# Patient Record
Sex: FEMALE | Race: WHITE
[De-identification: names, ages, dates, MRNs, and addresses within clinical notes are randomized per-mention and may not be internally consistent; named-entity substitution may affect disease eponyms.]

---

## 2018-02-05 ENCOUNTER — HOSPITAL ENCOUNTER (INPATIENT)
Dept: HOSPITAL 5 - TRG | Age: 33
LOS: 2 days | Discharge: HOME | End: 2018-02-07
Attending: OBSTETRICS & GYNECOLOGY | Admitting: OBSTETRICS & GYNECOLOGY
Payer: MEDICAID

## 2018-02-05 DIAGNOSIS — Z3A.38: ICD-10-CM

## 2018-02-05 LAB
HCT VFR BLD CALC: 40.4 % (ref 30.3–42.9)
HGB BLD-MCNC: 14 GM/DL (ref 10.1–14.3)
MCH RBC QN AUTO: 33 PG (ref 28–32)
MCHC RBC AUTO-ENTMCNC: 35 % (ref 30–34)
MCV RBC AUTO: 94 FL (ref 79–97)
PLATELET # BLD: 204 K/MM3 (ref 140–440)
RBC # BLD AUTO: 4.31 M/MM3 (ref 3.65–5.03)

## 2018-02-05 PROCEDURE — 85027 COMPLETE CBC AUTOMATED: CPT

## 2018-02-05 PROCEDURE — 36415 COLL VENOUS BLD VENIPUNCTURE: CPT

## 2018-02-05 PROCEDURE — 86850 RBC ANTIBODY SCREEN: CPT

## 2018-02-05 PROCEDURE — 85018 HEMOGLOBIN: CPT

## 2018-02-05 PROCEDURE — G0463 HOSPITAL OUTPT CLINIC VISIT: HCPCS

## 2018-02-05 PROCEDURE — 86592 SYPHILIS TEST NON-TREP QUAL: CPT

## 2018-02-05 PROCEDURE — 99211 OFF/OP EST MAY X REQ PHY/QHP: CPT

## 2018-02-05 PROCEDURE — 3E0R3BZ INTRODUCTION OF ANESTHETIC AGENT INTO SPINAL CANAL, PERCUTANEOUS APPROACH: ICD-10-PCS | Performed by: OBSTETRICS & GYNECOLOGY

## 2018-02-05 PROCEDURE — 86901 BLOOD TYPING SEROLOGIC RH(D): CPT

## 2018-02-05 PROCEDURE — 59025 FETAL NON-STRESS TEST: CPT

## 2018-02-05 PROCEDURE — 85014 HEMATOCRIT: CPT

## 2018-02-05 PROCEDURE — A6250 SKIN SEAL PROTECT MOISTURIZR: HCPCS

## 2018-02-05 PROCEDURE — 86900 BLOOD TYPING SEROLOGIC ABO: CPT

## 2018-02-05 PROCEDURE — 0HQ9XZZ REPAIR PERINEUM SKIN, EXTERNAL APPROACH: ICD-10-PCS | Performed by: OBSTETRICS & GYNECOLOGY

## 2018-02-05 RX ADMIN — FENTANYL CITRATE PRN MCG: 50 INJECTION, SOLUTION INTRAMUSCULAR; INTRAVENOUS at 16:37

## 2018-02-05 RX ADMIN — SODIUM CHLORIDE, SODIUM LACTATE, POTASSIUM CHLORIDE, AND CALCIUM CHLORIDE SCH MLS/HR: .6; .31; .03; .02 INJECTION, SOLUTION INTRAVENOUS at 17:18

## 2018-02-05 RX ADMIN — SODIUM CHLORIDE, SODIUM LACTATE, POTASSIUM CHLORIDE, AND CALCIUM CHLORIDE SCH MLS/HR: .6; .31; .03; .02 INJECTION, SOLUTION INTRAVENOUS at 14:40

## 2018-02-05 RX ADMIN — FENTANYL CITRATE PRN MCG: 50 INJECTION, SOLUTION INTRAMUSCULAR; INTRAVENOUS at 14:40

## 2018-02-05 RX ADMIN — SODIUM CHLORIDE, SODIUM LACTATE, POTASSIUM CHLORIDE, AND CALCIUM CHLORIDE SCH MLS/HR: .6; .31; .03; .02 INJECTION, SOLUTION INTRAVENOUS at 16:33

## 2018-02-05 NOTE — ANESTHESIA CONSULTATION
Anesthesia Consult and Med Hx


Date of service: 02/05/18





- Airway


Anesthetic Teeth Evaluation: Good


ROM Head & Neck: Adequate


Mental/Hyoid Distance: Adequate


Mallampati Class: Class II


Intubation Access Assessment: Probably Good





- Pre-Operative Health Status


ASA Pre-Surgery Classification: ASA2


Proposed Anesthetic Plan: Epidural, Spinal





- Pulmonary


Hx Asthma: No


COPD: No


Hx Pneumonia: No





- Cardiovascular System


Hx Hypertension: No





- Central Nervous System


Hx Seizures: No


Hx Psychiatric Problems: No





- Endocrine


Hx Renal Disease: No


Hx End Stage Renal Disease: No


Hx Hypothyroidism: No


Hx Hyperthyroidism: No





- Hematic


Hx Anemia: No


Hx Sickle Cell Disease: No





- Other Systems


Hx Alcohol Use: No

## 2018-02-05 NOTE — PROCEDURE NOTE
OB Delivery Note





- Delivery


Date of Delivery: 18


Surgeon: NEGRO CARR


Estimated blood loss: 300cc





- Vaginal


Delivery presentation: vertex


Delivery position: OA


Intrapartum events: none


Delivery induction: none


Delivery augmentation: rupture of membranes


Delivery monitor: external FHT, external uterine


Route of delivery: 


Delivery placenta: spontaneous


Delivery cord: nuchal cord (x2), 3 umbilical vessels


Episiotomy: none


Delivery laceration: 1st degree


Delivery repair: chromic


Anesthesia: epidural


Delivery comments: 





Viable male  delivered over intact perineum with double nuchal cord 

easily reduced on the perineum. Weight 6 pounds 7 ounces. Apgars 8,9. Placenta 

delivered spontaneously and intact with 3vc. Small laceration repaired with 3.0 

chromic. Patient tolerated procedure well. Excellent hemostasis





- Infant


  ** A


Apgar at 1 minute: 8


Apgar at 5 minutes: 9


Infant Gender: Male (6 pounds 7 ounces)

## 2018-02-05 NOTE — HISTORY AND PHYSICAL REPORT
History of Present Illness


Date of examination: 18


Date of admission: 


18 11:42





Chief complaint: 





I'm having contractions


History of present illness: 





Patient is a 32 year old  who presents at 38 weeks today with EDC 18. 

Patient started prenatal care at 16 weeks after moving here from [place].





Past History


Past Medical History: no pertinent history


Past Surgical History: no surgical history


Social history: 





- Obstetrical History


Expected Date of Delivery: 18


Actual Gestation: 38 Week(s) 0 Day(s) 


: 2


Para: 1


Number of Living Children: 1





Medications and Allergies


 Allergies











Allergy/AdvReac Type Severity Reaction Status Date / Time


 


No Known Allergies Allergy   Verified 18 14:15











 Home Medications











 Medication  Instructions  Recorded  Confirmed  Last Taken  Type


 


Pnv,Calcium 72/Iron/Folic Acid 1 tab PO QDAY 18 18:00 

History





[Pnv Prenatal Plus Multivit Tab]     











Active Meds: 


Active Medications





Butorphanol Tartrate (Stadol)  1 mg IV Q2H PRN


   PRN Reason: Pain, Moderate (4-6)


Ephedrine Sulfate (Ephedrine Sulfate)  10 mg IV Q2M PRN


   PRN Reason: Hypotension


Fentanyl (Sublimaze)  100 mcg IV Q2H PRN


   PRN Reason: Labor Pain


   Last Admin: 18 16:37 Dose:  100 mcg


Fentanyl (Sublimaze)  100 mcg IV Q2H PRN


   PRN Reason: Labor Pain


Oxytocin/Sodium Chloride (Pitocin/Ns 20 Unit/1000ml Drip)  20 units in 1,000 

mls @ 0 mls/hr IV AS DIRECT CA


   PRN Reason: As Directed


Oxytocin/Sodium Chloride (Pitocin/Ns 20 Unit/1000ml Drip)  20 units in 1,000 

mls @ 125 mls/hr IV AS DIRECT CA


Oxytocin/Sodium Chloride (Pitocin/Ns 30 Unit/500ml)  30 units in 500 mls @ 1 mls

/hr IV TITR CA; 1 MILLIUNITS/MIN


   PRN Reason: Protocol


Oxytocin/Sodium Chloride (Pitocin/Ns 30 Unit/500ml)  30 units in 500 mls @ 2 mls

/hr IV TITR CA


   PRN Reason: Protocol


Fentanyl/Bupivacaine/Sodium Chlor (Fentanyl-Bupiv 2 Mcg/Ml-0.125%)  200 mcg in 

100 mls @ 12 mls/hr EPIDURAL TITR CA


   PRN Reason: Protocol


   Last Admin: 18 17:42 Dose:  12 mls/hr


Dextrose/Lactated Ringer's (D5lr)  1,000 mls @ 125 mls/hr IV AS DIRECT CA


   Last Admin: 18 18:52 Dose:  125 mls/hr


Mineral Oil (Mineral Oil)  30 ml PO QHS PRN


   PRN Reason: Constipation


Naloxone HCl (Narcan 2 Mg/2 Ml)  0.2 mg IV Q5M PRN


   PRN Reason: Respiratory sedation


Ondansetron HCl (Zofran)  4 mg IV Q8H PRN


   PRN Reason: Nausea And Vomiting


Terbutaline Sulfate (Brethine)  0.25 mg SUB-Q ONCE PRN


   PRN Reason: Hyperstimulation/Hypertonicity


Terbutaline Sulfate (Brethine)  0.25 mg IVP ONCE PRN


   PRN Reason: Hyperstimulation/Hypertonicity











Review of Systems


All systems: negative


Genitourinary: pelvic pain, contractions





- Vital Signs


Vital signs: 


 Vital Signs











Pulse Pulse Ox


 


 73   100 


 


 18 11:49  18 11:49








 











Temp Pulse Resp BP Pulse Ox


 


 97.2 F L  107 H  20   110/56   99 


 


 18 20:00  18 23:11  18 20:00  18 23:09  18 23:11














- Physical Exam


Cardiovascular: Regular rate, Normal S1, Normal S2


Lungs: Positive: Clear to auscultation, Normal air movement


Abdomen: Positive: normal appearance, soft, normal bowel sounds


Genitourinary (Female): Positive: normal external genitalia, normal perenium


Vagina: Positive: normal moisture





- Obstetrical


FHR: auscultation normal


Cervical Dilatation: 2


Cervical Effacement Percentage: 90


Fetal station: -2


Uterine Contraction Frequency (min): 5


Uterine Contraction Pattern: Regular


Uterine Tone Measurement Phase: Contraction


Uterine Contraction Intensity: Moderate





Results


Result Diagrams: 


 18 14:40





 Abnormal lab results











  18 Range/Units





  14:40 


 


WBC  11.9 H  (4.5-11.0)  K/mm3


 


MCH  33 H  (28-32)  pg


 


MCHC  35 H  (30-34)  %








All other labs normal.








Assessment and Plan





IUP at 38 weeks in active labor. Admit to L&D. AROM when able. Patient may have 

epidural. Anticipate .

## 2018-02-06 LAB
HCT VFR BLD CALC: 32.7 % (ref 30.3–42.9)
HGB BLD-MCNC: 11.3 GM/DL (ref 10.1–14.3)

## 2018-02-06 RX ADMIN — IBUPROFEN SCH MG: 600 TABLET, FILM COATED ORAL at 01:37

## 2018-02-06 RX ADMIN — IBUPROFEN SCH: 600 TABLET, FILM COATED ORAL at 00:20

## 2018-02-06 RX ADMIN — DOCUSATE SODIUM SCH MG: 100 CAPSULE, LIQUID FILLED ORAL at 14:05

## 2018-02-06 RX ADMIN — Medication SCH EACH: at 14:05

## 2018-02-06 RX ADMIN — DOCUSATE SODIUM SCH MG: 100 CAPSULE, LIQUID FILLED ORAL at 21:31

## 2018-02-06 NOTE — PROGRESS NOTE
Subjective


Date of service: 02/06/18


Interval history: 





1st day after normal vaginal delivery


Patient is in the bed, comfortable. Pain is well controlled with pain meds. 

Ambulated well. No residual neurological deficit. No anesthesia complications





Objective





- Constitutional


Vitals: 


 Vital Signs - 12hr











  02/06/18





  08:55


 


Temperature 98.2 F


 


Pulse Rate 80


 


Respiratory 16





Rate 


 


Blood Pressure 97/58





[Left] 














- Labs


CBC & Chem 7: 


 02/06/18 11:45

## 2018-02-07 VITALS — SYSTOLIC BLOOD PRESSURE: 100 MMHG | DIASTOLIC BLOOD PRESSURE: 61 MMHG

## 2018-02-07 RX ADMIN — Medication SCH EACH: at 12:50

## 2018-02-07 RX ADMIN — DOCUSATE SODIUM SCH MG: 100 CAPSULE, LIQUID FILLED ORAL at 12:50

## 2018-02-07 RX ADMIN — IBUPROFEN SCH MG: 600 TABLET, FILM COATED ORAL at 05:04

## 2018-02-07 RX ADMIN — IBUPROFEN SCH MG: 600 TABLET, FILM COATED ORAL at 12:49

## 2018-02-07 NOTE — PROGRESS NOTE
Assessment and Plan





PPD 2 s/p . Doing well. Baby being tested for bili and if ok, patient would 

like to be discharged today.





Subjective





- Subjective


Date of service: 18


Interval history: 





Patient is a 32 year old  who presents at 38 weeks today with EDC 18. 

Patient started prenatal care at 16 weeks after moving here from [place].


Patient reports: appetite normal, voiding normally, pain well controlled, 

ambulating normally


[place]: doing well





Objective





- Vital Signs


Latest vital signs: 


 Vital Signs











  Temp Pulse Resp BP Pulse Ox


 


 18 08:25  97.7 F  88  18  91/43 


 


 18 23:18  98.3 F  92 H  18  100/60  98


 


 18 12:00  98.4 F  95 H  16  94/61 








 Intake and Output











 18





 22:59 06:59 14:59


 


Intake Total 360 600 120


 


Output Total 600  


 


Balance -240 600 120


 


Intake:   


 


  Oral 360 240 120


 


  Intake, Free Water  360 


 


Output:   


 


  Urine 600  


 


    Void 600  


 


Other:   


 


  Total, Intake Amount 120 240 120


 


  Total, Output Amount 600  


 


  # Voids   


 


    Void 3 2 














- Exam


Breasts: Present: deferred


Cardiovascular: Present: Regular rate, Normal S1, Normal S2


Lungs: Present: Clear to auscultation, Normal air movement


Abdomen: Present: normal appearance, soft, normal bowel sounds


Vulva: both: normal


Uterus: Present: normal, firm


Extremities: Present: normal


Deep Tendon Reflex Grade: Normal +2

## 2018-02-07 NOTE — DISCHARGE SUMMARY
Providers





- Providers


Date of Admission: 


18 11:42





Date of discharge: 18


Attending physician: 


NEGRO CARR





Primary care physician: 


NEGRO CARR








Hospitalization


Reason for admission: active labor


Delivery: 


Episiotomy: none


Laceration: 1st degree


Postpartum complications: none


Discharge diagnosis: IUP at term delivered


 baby: male


Hospital course: 





unremarkable


Condition at discharge: Good


Disposition: DC-01 TO HOME OR SELFCARE





Plan





- Discharge Medications


Prescriptions: 


Ibuprofen [Motrin 600 MG tab] 600 mg PO Q6HR #40 tablet





- Provider Discharge Summary


Activity: routine, no sex for 6 weeks, no heavy lifting 4 weeks, no strenuous 

exercise


Diet: routine


Instructions: routine


Additional instructions: 


[]  Smoking cessation referral if applicable(refer to patient education folder 

for contact #)


[]  Refer to [facility]'s [facility] Booklet








Call your doctor immediately for:


* Fever > 100.5


* Heavy vaginal bleeding ( >1 pad per hour)


* Severe persistent headache


* Shortness of breath


* Reddened, hot, painful area to leg or breast


* Drainage or odor from incision.





* Keep incision clean and dry at all times and follow doctor's instructions 

regarding bathing/showering











- Follow up plan


Follow up: 


NEGRO CARR MD [Primary Care Provider] - 6 Weeks